# Patient Record
Sex: FEMALE | Race: BLACK OR AFRICAN AMERICAN
[De-identification: names, ages, dates, MRNs, and addresses within clinical notes are randomized per-mention and may not be internally consistent; named-entity substitution may affect disease eponyms.]

---

## 2021-12-15 ENCOUNTER — HOSPITAL ENCOUNTER (EMERGENCY)
Dept: HOSPITAL 97 - ER | Age: 41
Discharge: LEFT BEFORE DISCHARGE | End: 2021-12-15
Payer: SELF-PAY

## 2021-12-15 VITALS — TEMPERATURE: 97 F | DIASTOLIC BLOOD PRESSURE: 70 MMHG | SYSTOLIC BLOOD PRESSURE: 108 MMHG | OXYGEN SATURATION: 100 %

## 2021-12-15 DIAGNOSIS — M79.644: Primary | ICD-10-CM

## 2021-12-15 DIAGNOSIS — Z53.21: ICD-10-CM

## 2021-12-15 PROCEDURE — 99281 EMR DPT VST MAYX REQ PHY/QHP: CPT

## 2021-12-15 NOTE — XMS REPORT
Continuity of Care Document

                          Created on:December 15, 2021



Patient:SYLVIA FOSTER

Sex:Female

:1980

External Reference #:[id number]





Demographics







                          Address                   [address] 



                                                    [place] [address]

 

                          Phone                     Unavailable

 

                          Preferred Language        Unknown

 

                          Marital Status            Unknown

 

                          Zoroastrianism Affiliation     Unknown

 

                          Race                      Unknown

 

                          Additional Race(s)        Unavailable

 

                          Ethnic Group              Unknown









Author







                          Organization              [facility]

 

                          Address                   [address]



                                                    [place] [address]

 

                          Phone                     [phone]









Care Team Providers







                    Name                Role                Phone

 

                    GREG              Attending Clinician Unavailable









Problems

This patient has no known problems.



Allergies, Adverse Reactions, Alerts

This patient has no known allergies or adverse reactions.



Medications

This patient has no known medications.



Procedures

This patient has no known procedures.



Encounters







        Start   End     Encounter Admission Attending Care    Care    Encounter 

Source



        Date/Time Date/Time Type    Type    Clinicians Facility Department ID   

   

 

        2021 Emergency         GREG [facility]     [id number]     [id number]

[address]



        00:00:00 00:00:00                 [facility]                   [id number]

i



                                                                        st







Results

This patient has no known results.

## 2021-12-15 NOTE — ER
Nurse's Notes                                                                                     

 [facility]                                                                 

Name: Marlin Monroe                                                                                 

Age: 41 yrs                                                                                       

Sex: Female                                                                                       

: 1980                                                                                   

MRN: [id number]                                                                                   

Arrival Date: 12/15/2021                                                                          

Time: 16:13                                                                                       

Account#: [id number]                                                                            

Bed Waiting                                                                                       

Private MD:                                                                                       

Diagnosis:                                                                                        

                                                                                                  

Presentation:                                                                                     

12/15                                                                                             

16:25 Chief complaint: Patient states: R hand 2nd digit pain, swelling, redness near nailbed  ss  

      for 5 days. Coronavirus screen: Vaccine status: Patient reports receiving the 1st dose      

      of the Covid vaccine. Client denies travel out of the [place] in the last 14 days. At this     

      time, the client does not indicate any symptoms associated with coronavirus-19. Ebola       

      Screen: Patient denies travel to an Ebola-affected area in the 21 days before illness       

      onset. Initial Sepsis Screen: Does the patient meet any 2 criteria? HR > 90 bpm. No.        

      Patient's initial sepsis screen is negative. Does the patient have a suspected source       

      of infection? Yes: Skin breakdown/wound. Risk Assessment: Do you want to hurt yourself      

      or someone else? Patient reports no desire to harm self or others. Onset of symptoms        

      was December 10, 2021.                                                                      

16:25 Method Of Arrival: Ambulatory                                                           ss  

16:25 Acuity: TATE 4                                                                           ss  

                                                                                                  

Historical:                                                                                       

- Allergies:                                                                                      

16:23 No Known Allergies;                                                                     ss  

- PMHx:                                                                                           

16:23 None;                                                                                   ss  

- PSHx:                                                                                           

16:23  section; gastric bypass; tummy tuck, breast reduction;                         ss  

                                                                                                  

- Immunization history:: Client reports receiving the 1st dose of the Covid vaccine.              

- Social history:: Smoking status: Patient denies any tobacco usage or history of.                

                                                                                                  

                                                                                                  

Assessment:                                                                                       

17:59 Reassessment: Attempted to call patient to exam chair. No answer. Unable to locate      ss  

      patient.                                                                                    

18:26 Reassessment: called to exam room. No answer. Not a working number on file.               

                                                                                                  

Vital Signs:                                                                                      

16:25  / 70; Pulse 97; Resp 17; Temp 97.0; Pulse Ox 100% ; Weight 88.9 kg; Height 5 ft. ss  

      3 in. (160.02 cm); Pain 8/10;                                                               

16:25 Body Mass Index 34.72 (88.90 kg, 160.02 cm)                                               

                                                                                                  

ED Course:                                                                                        

16:13 Patient arrived in ED.                                                                  as  

16:23 Arm band placed on.                                                                     ss  

16:26 Triage completed.                                                                       ss  

                                                                                                  

Administered Medications:                                                                         

No medications were administered                                                                  

                                                                                                  

                                                                                                  

Outcome:                                                                                          

18:27 Patient left the ED.                                                                    ss  

                                                                                                  

Signatures:                                                                                       

Carla Paz Shelby RN                      RN   ss                                                   

                                                                                                  

Corrections: (The following items were deleted from the chart)                                    

16:26 16:25 Pulse 97bpm; Resp 17bpm; Pulse Ox 100%; Temp 97.0F; 88.9 kg; Height 5 ft. 3 in.;  ss  

      BMI: 34.7; Pain 8/10; ss                                                                    

                                                                                                  

**************************************************************************************************